# Patient Record
Sex: MALE | Race: BLACK OR AFRICAN AMERICAN | NOT HISPANIC OR LATINO | Employment: UNEMPLOYED | URBAN - METROPOLITAN AREA
[De-identification: names, ages, dates, MRNs, and addresses within clinical notes are randomized per-mention and may not be internally consistent; named-entity substitution may affect disease eponyms.]

---

## 2020-05-14 ENCOUNTER — HOSPITAL ENCOUNTER (EMERGENCY)
Facility: HOSPITAL | Age: 30
Discharge: HOME | End: 2020-05-14
Attending: EMERGENCY MEDICINE
Payer: MEDICAID

## 2020-05-14 VITALS
OXYGEN SATURATION: 100 % | DIASTOLIC BLOOD PRESSURE: 87 MMHG | HEART RATE: 89 BPM | TEMPERATURE: 99 F | RESPIRATION RATE: 20 BRPM | HEIGHT: 72 IN | WEIGHT: 178 LBS | SYSTOLIC BLOOD PRESSURE: 153 MMHG | BODY MASS INDEX: 24.11 KG/M2

## 2020-05-14 DIAGNOSIS — F41.9 ANXIETY: Primary | ICD-10-CM

## 2020-05-14 DIAGNOSIS — R00.2 PALPITATIONS: ICD-10-CM

## 2020-05-14 PROCEDURE — 93010 EKG 12-LEAD: ICD-10-PCS | Mod: ,,, | Performed by: INTERNAL MEDICINE

## 2020-05-14 PROCEDURE — 93010 ELECTROCARDIOGRAM REPORT: CPT | Mod: ,,, | Performed by: INTERNAL MEDICINE

## 2020-05-14 PROCEDURE — 93005 ELECTROCARDIOGRAM TRACING: CPT | Mod: ER

## 2020-05-14 PROCEDURE — 99283 EMERGENCY DEPT VISIT LOW MDM: CPT | Mod: 25,ER

## 2020-05-14 RX ORDER — HYDROXYZINE PAMOATE 25 MG/1
25 CAPSULE ORAL EVERY 8 HOURS PRN
Qty: 14 CAPSULE | Refills: 0 | Status: SHIPPED | OUTPATIENT
Start: 2020-05-14

## 2020-05-14 NOTE — ED PROVIDER NOTES
Encounter Date: 5/14/2020       History     Chief Complaint   Patient presents with    Anxiety     c/o feeling anxious today with lip tingling; denies SI, HI, AH, VH; reports intermittent episodes of anxiety over the last year; reports symptoms subsiding at present     30-year-old male presents emergency department complaining of anxiety.  States over the past 1-2 months he has been having episodes where he feels very anxious.  States he knows that they are coming on because he gets palpitations and his stomach tightness.  Denies any chest pain or shortness of breath.  States that his symptoms have resolved at this time.  Notes he has discussed this with his primary care physician who is referring him to a mental health clinic.  No other symptoms reported at this time.        Review of patient's allergies indicates:  No Known Allergies  History reviewed. No pertinent past medical history.  History reviewed. No pertinent surgical history.  History reviewed. No pertinent family history.  Social History     Tobacco Use    Smoking status: Current Every Day Smoker     Packs/day: 1.00     Types: Cigarettes    Smokeless tobacco: Never Used   Substance Use Topics    Alcohol use: Yes     Comment: occasionally    Drug use: Not Currently     Review of Systems   Constitutional: Negative for chills and fever.   Respiratory: Negative for cough and shortness of breath.    Cardiovascular: Positive for palpitations. Negative for chest pain.   Gastrointestinal: Negative for nausea and vomiting.   Neurological: Negative for light-headedness, numbness and headaches.       Physical Exam     Initial Vitals [05/14/20 1047]   BP Pulse Resp Temp SpO2   (!) 153/87 89 20 98.6 °F (37 °C) 100 %      MAP       --         Physical Exam    Nursing note and vitals reviewed.  Constitutional: He appears well-developed and well-nourished. No distress.   HENT:   Head: Normocephalic and atraumatic.   Eyes: Conjunctivae and EOM are normal. Pupils  are equal, round, and reactive to light.   Neck: Normal range of motion. Neck supple. No tracheal deviation present.   Cardiovascular: Normal rate and intact distal pulses.   Pulmonary/Chest: No respiratory distress.   Musculoskeletal: Normal range of motion.   Neurological: He is alert and oriented to person, place, and time. He has normal strength. No cranial nerve deficit.   Skin: Skin is warm and dry.         ED Course   Procedures  Labs Reviewed - No data to display  EKG Readings: (Independently Interpreted)   Initial Reading: No STEMI. Previous EKG Date: No prior for comparison. Rhythm: Normal Sinus Rhythm. Heart Rate: 74. Ectopy: No Ectopy. Conduction: Incomplete RBBB. ST Segments: Normal ST Segments. Axis: Normal.            Medical Decision Making:   Initial Assessment:   30-year-old male presents emergency department complaining palpitations, anxiety  Differential Diagnosis:   Arrhythmia, dehydration, anxiety,  Independently Interpreted Test(s):   I have ordered and independently interpreted EKG Reading(s) - see prior notes  ED Management:  Patient denies any symptoms at this time.  His anxiety attack has resolved.  EKG within normal limits.  Discussed disposition including discharge with instructions to follow up with primary care physician, prescription for Vistaril, reasons to return to the emergency room.                                 Clinical Impression:       ICD-10-CM ICD-9-CM   1. Anxiety F41.9 300.00   2. Palpitations R00.2 785.1         Disposition:   Disposition: Discharged  Condition: Stable     ED Disposition Condition    Discharge Stable        ED Prescriptions     Medication Sig Dispense Start Date End Date Auth. Provider    hydrOXYzine pamoate (VISTARIL) 25 MG Cap Take 1 capsule (25 mg total) by mouth every 8 (eight) hours as needed (Anxiety). 14 capsule 5/14/2020  Dennys Cochran MD        Follow-up Information     Follow up With Specialties Details Why Contact Info    Your primary  care physician  Schedule an appointment as soon as possible for a visit                                        Dennys Cochran MD  05/14/20 1630

## 2020-07-12 ENCOUNTER — HOSPITAL ENCOUNTER (EMERGENCY)
Facility: HOSPITAL | Age: 30
Discharge: HOME | End: 2020-07-12
Attending: EMERGENCY MEDICINE
Payer: MEDICAID

## 2020-07-12 VITALS
TEMPERATURE: 99 F | WEIGHT: 180 LBS | BODY MASS INDEX: 24.38 KG/M2 | HEIGHT: 72 IN | SYSTOLIC BLOOD PRESSURE: 125 MMHG | OXYGEN SATURATION: 100 % | RESPIRATION RATE: 18 BRPM | HEART RATE: 95 BPM | DIASTOLIC BLOOD PRESSURE: 88 MMHG

## 2020-07-12 VITALS
SYSTOLIC BLOOD PRESSURE: 142 MMHG | RESPIRATION RATE: 20 BRPM | OXYGEN SATURATION: 100 % | DIASTOLIC BLOOD PRESSURE: 91 MMHG | HEART RATE: 101 BPM | TEMPERATURE: 100 F

## 2020-07-12 DIAGNOSIS — B34.9 VIRAL SYNDROME: Primary | ICD-10-CM

## 2020-07-12 DIAGNOSIS — R07.89 CHEST TIGHTNESS: Primary | ICD-10-CM

## 2020-07-12 DIAGNOSIS — R05.9 COUGH: ICD-10-CM

## 2020-07-12 DIAGNOSIS — F41.9 ANXIETY: ICD-10-CM

## 2020-07-12 PROCEDURE — 93005 ELECTROCARDIOGRAM TRACING: CPT | Mod: ER

## 2020-07-12 PROCEDURE — 99282 EMERGENCY DEPT VISIT SF MDM: CPT | Mod: 25,ER

## 2020-07-12 PROCEDURE — U0003 INFECTIOUS AGENT DETECTION BY NUCLEIC ACID (DNA OR RNA); SEVERE ACUTE RESPIRATORY SYNDROME CORONAVIRUS 2 (SARS-COV-2) (CORONAVIRUS DISEASE [COVID-19]), AMPLIFIED PROBE TECHNIQUE, MAKING USE OF HIGH THROUGHPUT TECHNOLOGIES AS DESCRIBED BY CMS-2020-01-R: HCPCS | Mod: ER

## 2020-07-12 PROCEDURE — 93010 EKG 12-LEAD: ICD-10-PCS | Mod: ,,, | Performed by: INTERNAL MEDICINE

## 2020-07-12 PROCEDURE — 99284 EMERGENCY DEPT VISIT MOD MDM: CPT | Mod: 25,27,ER

## 2020-07-12 PROCEDURE — 93010 ELECTROCARDIOGRAM REPORT: CPT | Mod: ,,, | Performed by: INTERNAL MEDICINE

## 2020-07-12 RX ORDER — HYDROXYZINE HYDROCHLORIDE 25 MG/1
25 TABLET, FILM COATED ORAL EVERY 6 HOURS
Qty: 12 TABLET | Refills: 0 | Status: SHIPPED | OUTPATIENT
Start: 2020-07-12

## 2020-07-12 RX ORDER — HYDROXYZINE HYDROCHLORIDE 10 MG/1
10 TABLET, FILM COATED ORAL
Status: DISCONTINUED | OUTPATIENT
Start: 2020-07-12 | End: 2020-07-12

## 2020-07-12 NOTE — ED PROVIDER NOTES
Encounter Date: 7/12/2020       History     Chief Complaint   Patient presents with    Chest Pain     Pt reports pressure to chest. States he has had anxiety attacks before but unsure if that's what is causing pain. PA speaking to pt on iPad.      30-year-old male presents to the emergency department for evaluation of chest tightness and shortness of breath.  He reports that the symptoms began gradually 30 min ago and have been intermittent since onset.  He reports that he has a history of anxiety , which he reports has similar symptoms.  He denies any fever, headache, dizziness, chest pain, palpitations, abdominal pain, nausea, vomiting, diarrhea or sweating.  He denies any pain to his jaw, neck or extremities.  He reports that he has not taken his anxiety medication yet.  He denies any leg swelling, recent surgery or recent travel.  He denies any known sick contacts.  He denies any suicidal/homicidal ideation.        Review of patient's allergies indicates:  No Known Allergies  History reviewed. No pertinent past medical history.  Past Surgical History:   Procedure Laterality Date    TESTICLE SURGERY       History reviewed. No pertinent family history.  Social History     Tobacco Use    Smoking status: Current Every Day Smoker     Packs/day: 1.00     Types: Cigarettes    Smokeless tobacco: Never Used   Substance Use Topics    Alcohol use: Yes     Comment: occasionally    Drug use: Not Currently     Review of Systems   Constitutional: Negative for activity change, appetite change and fever.   HENT: Negative for congestion, ear discharge, facial swelling, postnasal drip, rhinorrhea, sinus pressure, sore throat and trouble swallowing.    Eyes: Negative for photophobia and visual disturbance.   Respiratory: Positive for chest tightness and shortness of breath. Negative for cough and wheezing.    Cardiovascular: Negative for chest pain, palpitations and leg swelling.   Gastrointestinal: Negative for abdominal  pain, blood in stool, constipation, diarrhea, nausea and vomiting.   Genitourinary: Negative for decreased urine volume, dysuria, flank pain and hematuria.   Musculoskeletal: Negative for back pain, joint swelling, neck pain and neck stiffness.   Skin: Negative for rash.   Neurological: Negative for dizziness, syncope, weakness, light-headedness, numbness and headaches.   Hematological: Does not bruise/bleed easily.   Psychiatric/Behavioral: The patient is nervous/anxious.        Physical Exam     Initial Vitals [07/12/20 1615]   BP Pulse Resp Temp SpO2   132/85 (!) 114 20 98.8 °F (37.1 °C) 100 %      MAP       --         Physical Exam    Nursing note and vitals reviewed.  Constitutional: He appears well-developed and well-nourished. He is not diaphoretic. No distress.   HENT:   Head: Normocephalic and atraumatic.   Right Ear: External ear normal.   Left Ear: External ear normal.   Mouth/Throat: Oropharynx is clear and moist. No oropharyngeal exudate.   Eyes: Conjunctivae and EOM are normal. Pupils are equal, round, and reactive to light.   Neck: Normal range of motion. Neck supple.   Cardiovascular: Normal rate, regular rhythm and normal heart sounds.   Pulmonary/Chest: Breath sounds normal. No respiratory distress. He has no wheezes. He has no rhonchi. He has no rales. He exhibits no tenderness.   Abdominal: Soft. Bowel sounds are normal. He exhibits no distension. There is no abdominal tenderness. There is no guarding.   Lymphadenopathy:     He has no cervical adenopathy.   Neurological: He is alert and oriented to person, place, and time.   Skin: Skin is warm and dry.   Psychiatric: He has a normal mood and affect.         ED Course   Procedures  Labs Reviewed   SARS-COV-2 (COVID-19) QUALITATIVE PCR     EKG Readings: (Independently Interpreted)   Initial Reading: No STEMI. Rhythm: Normal Sinus Rhythm. Heart Rate: 109.       Imaging Results          X-Ray Chest AP Portable (Final result)  Result time 07/12/20  17:04:40    Final result by Terrence Aguillon MD (07/12/20 17:04:40)                 Impression:      No acute findings.      Electronically signed by: Terrence Aguillon MD  Date:    07/12/2020  Time:    17:04             Narrative:    EXAMINATION:  XR CHEST AP PORTABLE    CLINICAL HISTORY:  Cough    TECHNIQUE:  AP view of the chest was performed.    COMPARISON:  None    FINDINGS:  The cardiac and mediastinal silhouettes appear within normal limits.   The lungs are clear bilaterally.  No acute osseous findings demonstrated.                                 Medical Decision Making:   Initial Assessment:   30-year-old male presents for evaluation of chest tightness and intermittent shortness of breath.  Physical exam reveals a nontoxic-appearing male in no acute distress.  Patient is afebrile and mildly tachycardic, all other results within normal limits..  Neurological exam reveals an alert and oriented patient.  TMs reveal no erythema.  Posterior pharynx reveals no erythema, edema or tonsillar exudate.  Neck is supple, no meningeal signs noted.  Lungs clear to auscultation bilaterally.  No respiratory distress or accessory muscle use noted.  Abdominal exam reveals soft abdomen, nontender palpation.  No peripheral edema noted.  Differential Diagnosis:   Anxiety  COVID-19  Viral URI  I carefully considered but doubt serious etiology including PE.    ED Management:  Patient refused Atarax stating that he will take some when he is home.  COVID 19 test pending.  Chest x-ray report reveals no acute findings.  EKG reveals no acute ST changes.  Upon re-evaluation patient reports symptoms have much improved.  Instructed the patient to follow up with his primary care provider for re-evaluation and to return to the emergency department immediately for any new or worsening symptoms.  I discussed this patient at length with Dr. Monk who is in agreement course of treatment.                                 Clinical Impression:        ICD-10-CM ICD-9-CM   1. Chest tightness  R07.89 786.59   2. Cough  R05 786.2   3. Anxiety  F41.9 300.00             ED Disposition Condition    Discharge Stable        ED Prescriptions     Medication Sig Dispense Start Date End Date Auth. Provider    hydrOXYzine HCL (ATARAX) 25 MG tablet Take 1 tablet (25 mg total) by mouth every 6 (six) hours. 12 tablet 7/12/2020  Viridiana Reyes PA-C        Follow-up Information    None                                    Viridiana Reyes PA-C  07/12/20 1736       Viridiana Reyes PA-C  07/12/20 1739

## 2020-07-12 NOTE — DISCHARGE INSTRUCTIONS
Your COVID test is pending.  Your chest x-ray did not reveal any evidence of pneumonia or consolidation.  You are advised to self quarantine, rest and drink plenty of clear fluid.  You are advised to return to the emergency department immediately for any new or worsening symptoms .

## 2020-07-13 LAB — SARS-COV-2 RNA RESP QL NAA+PROBE: NOT DETECTED

## 2020-07-13 NOTE — ED PROVIDER NOTES
Chief Complaint: not feeling well     History of Present Illness:    Sierra Fowler 30 y.o. with a  has a past medical history of Anxiety. who presents to the emergency department today with a complaint of not feeling well when he woke up after a nap. He was seen earlier today, had a work including COVID swab. He went home and took a nap. When he woke up, he had some body aches. He felt hot but took his temp but it was not elevated. His throat felt dry. He denies cough, sputum production. He reports occasionally he feels short of breath. He is not short of breath presently. No abd pain. No diarrhea. No vomiting. No headache. No rashes.     ROS    Constitutional: No fever, no chills.  Eyes: No discharge. No pain.  HENT: No nasal drainage. No ear ache. No sore throat.  Cardiovascular: No chest pain, no palpitations.  Respiratory: No cough, no shortness of breath.  Gastrointestinal: No abdominal pain, no vomiting. No diarrhea.  Genitourinary: No hematuria, dysuria, urgency.  Musculoskeletal: No back pain.   Skin: No rashes, no lesions.  Neurological: No headache, no focal weakness.    Otherwise remaining ROS negative     The history is provided by the patient      ALLERGIES REVIEWED  MEDICATIONS REVIEWED  PMH/PSH/SOC/FH REVIEWED       Past Medical History:   Diagnosis Date    Anxiety          Past Surgical History:   Procedure Laterality Date    TESTICLE SURGERY           Social History     Tobacco Use    Smoking status: Current Every Day Smoker     Packs/day: 1.00     Types: Cigarettes    Smokeless tobacco: Never Used   Substance Use Topics    Alcohol use: Yes     Comment: occasionally    Drug use: Not Currently       History reviewed. No pertinent family history.    Nursing/Ancillary staff note reviewed.  VS reviewed         Physical Exam     BP (!) 142/91   Pulse 101   Temp 99.6 °F (37.6 °C)   Resp 20   SpO2 100%     Physical Exam  Vitals signs and nursing note reviewed.   Constitutional:       General:  He is not in acute distress.     Appearance: He is well-developed.   HENT:      Head: Normocephalic and atraumatic.      Right Ear: External ear normal.      Left Ear: External ear normal.   Eyes:      General:         Right eye: No discharge.         Left eye: No discharge.      Conjunctiva/sclera: Conjunctivae normal.   Neck:      Musculoskeletal: Normal range of motion.   Cardiovascular:      Rate and Rhythm: Normal rate.   Pulmonary:      Effort: Pulmonary effort is normal. No respiratory distress.   Abdominal:      General: There is no distension.   Musculoskeletal: Normal range of motion.   Skin:     General: Skin is warm and dry.   Neurological:      Mental Status: He is alert and oriented to person, place, and time.      Cranial Nerves: No cranial nerve deficit.      Motor: No abnormal muscle tone.   Psychiatric:         Behavior: Behavior normal.         DIFFERENTIAL DIAGNOSIS: After history and physical exam a differential diagnosis was considered, but was not limited to, influenza, bronchitis, URI, cough, laryngitis, tracheitis, asthma, sinusitis, pneumonia, viral, COPD, medication side effect.        ED Course                 Medical Decision Making:   History:   I obtained history from:  The patient  Old Medical Records: I decided to obtain old medical records. Seen today for chest tightness and shortness of breath. H/O anxiety. Normal CXR. COVID swab pending.      ED Management:  Sierra Fowler  presents to the emergency Department today with not feeling well after he woke up from taking a nap. He is stable here in the ED. Afebrile. Oxygenating appropriately on room air. He is nontoxic in appearance. Likely experiencing symptoms of viral etiology. I have discussed this with him and I have discussed symptom control with the pt. He understands. He feels ready to go home. I discussed warning signs for return with him.  The pt is comfortable with this plan and comfortable going home at this time. After  taking into careful account the historical factors and physical exam findings of the patient's presentation today, in conjunction with the empirical and objective data obtained on ED workup, no acute emergent medical condition requiring admission has been identified. The patient appears to be low risk for an emergent medical condition and I feel it is safe and appropriate at this time for the patient to be discharged to follow-up as detailed in their discharge instructions for reevaluation and possible continued outpatient workup and management. Regardless, an unremarkable evaluation in the ED does not preclude the development or presence of a serious or life threatening condition. As such, patient was instructed to return immediately for any worsening or change in current symptoms. Precautions for return discussed at length.  Discharge and follow-up instructions discussed with the patient who expressed understanding and willingness to comply with my recommendations.    Voice recognition software utilized in this note.              Impression      The encounter diagnosis was Viral syndrome.      Follow-up Information     Your PCP. Schedule an appointment as soon as possible for a visit in 1 week.                        Charline Meredith MD  07/13/20 3483

## 2021-03-10 ENCOUNTER — HOSPITAL ENCOUNTER (EMERGENCY)
Facility: HOSPITAL | Age: 31
Discharge: HOME | End: 2021-03-10
Attending: EMERGENCY MEDICINE
Payer: MEDICAID

## 2021-03-10 VITALS
TEMPERATURE: 98 F | BODY MASS INDEX: 24.38 KG/M2 | RESPIRATION RATE: 20 BRPM | DIASTOLIC BLOOD PRESSURE: 93 MMHG | OXYGEN SATURATION: 100 % | HEART RATE: 104 BPM | SYSTOLIC BLOOD PRESSURE: 155 MMHG | HEIGHT: 72 IN | WEIGHT: 180 LBS

## 2021-03-10 DIAGNOSIS — S20.211A CONTUSION OF RIB ON RIGHT SIDE, INITIAL ENCOUNTER: Primary | ICD-10-CM
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
## 2021-03-10 DIAGNOSIS — R07.81 RIB PAIN ON RIGHT SIDE: ICD-10-CM

## 2021-03-10 PROCEDURE — 99284 EMERGENCY DEPT VISIT MOD MDM: CPT | Mod: 25,ER

## 2021-03-10 PROCEDURE — 93010 ELECTROCARDIOGRAM REPORT: CPT | Mod: ,,, | Performed by: INTERNAL MEDICINE

## 2021-03-10 PROCEDURE — 93005 ELECTROCARDIOGRAM TRACING: CPT | Mod: ER

## 2021-03-10 PROCEDURE — 93010 EKG 12-LEAD: ICD-10-PCS | Mod: ,,, | Performed by: INTERNAL MEDICINE
